# Patient Record
(demographics unavailable — no encounter records)

---

## 2025-01-08 NOTE — HISTORY OF PRESENT ILLNESS
[de-identified] : 59 y/o F p/w R ear swelling for the past x2 weeks. She reports low grade fever, night sweats, R ear pain, increased swelling to R ear compared to initial onset of symptoms (Dec 30th). She denies h/o similar symptoms, h/o diabetes.  states that she woke up with symptoms. Denies recent travel. She was placed on clindamycin by a family member, and applied warm compress with some relief. Denies ear trauma. H/o tympanomastoidectomy L ear by Dr. Barton. States that hearing loss only bothers here with background noise.

## 2025-01-08 NOTE — PHYSICAL EXAM
[Normal] : assessment of respiratory effort is normal [de-identified] : redness and swelling to helix of R ear

## 2025-01-08 NOTE — ASSESSMENT
[FreeTextEntry1] : 1. perichondritis of r ear -cipro -medrol dose alexy -avoid sleeping on affected side -avoid touching ear -RTC in x2 weeks